# Patient Record
Sex: MALE | Race: WHITE
[De-identification: names, ages, dates, MRNs, and addresses within clinical notes are randomized per-mention and may not be internally consistent; named-entity substitution may affect disease eponyms.]

---
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
## 2021-02-18 ENCOUNTER — HOSPITAL ENCOUNTER (EMERGENCY)
Dept: HOSPITAL 53 - M ED | Age: 21
Discharge: HOME | End: 2021-02-18
Payer: COMMERCIAL

## 2021-02-18 VITALS — DIASTOLIC BLOOD PRESSURE: 75 MMHG | SYSTOLIC BLOOD PRESSURE: 174 MMHG

## 2021-02-18 VITALS — BODY MASS INDEX: 26.9 KG/M2 | WEIGHT: 198.64 LBS | HEIGHT: 72 IN

## 2021-02-18 DIAGNOSIS — J02.9: Primary | ICD-10-CM

## 2021-02-18 NOTE — CCD
Continuity of Care Document

                             Created on: 02/15/2021



HARINDER SANCHEZ JR

External Reference #: [id number]

: 2000

Sex: Male



Demographics





                          Address                   [address] [place]  [address]

 

                          Home Phone                [phone]

 

                          Preferred Language        Unknown

 

                          Marital Status            Unknown

 

                          Episcopalian Affiliation     Unknown

 

                          Race                      White

 

                          Ethnic Group              Not  or 





Author





                          Author                    [facility]

 

                          Address                   [address] [place]  [address]



 

                          Phone                     [phone]







Support





                Name            Relationship    Address         Phone

 

                    Kavin MAY PRS                 [address] [place]  [address]               [phone]

 

                    LEIGH SANCHEZ    PRS                 [address] [place]  [address]                    [phone]







Care Team Providers





                    Care Team Member Name Role                Phone

 

                          PCP                       Unavailable







Allergies, Adverse Reactions, Alerts

No known allergies.



Medications

No known medications.



Problems

No problem information available.



Procedures

No procedure information available.



Relevant Diagnostic Tests and/or Laboratory Data

Laboratory Results





          Test      Date/Time Result    Interpretation Reference Range Result Co

mment Performing 

Site

 

          BIOPSY IV 2021 3:11am SENT TO [facility]                  

             [facility], [address] [place] [address]

 

           SARS-CoV-2 (PCR) 2021 9:00am Not Detected            No

t Detected This 

nucleic acid amplification test was developed and itsperformance characteristics
determined by [facility]. Nucleic acid amplification tests include RT-
PCR and TMA. This test has not been FDA cleared orapproved. This test has been 
authorized by FDA under anEmergency Use Authorization (EUA). This test is 
onlyauthorized for the duration of time the declaration thatcircumstances exist 
justifying the authorization of theemergency use of in vitro diagnostic tests 
for detection tuIXZR-QoB-7 virus and/or diagnosis of COVID-19 infectionunder 
section 564(b)(1) of the Act, 21 U.S.C. 360bbb-3(b)(1), unless the authorization
is terminated or revokedsooner.When diagnostic testing is negative, the 
possibility of afalse negative result should be considered in the contextof a 
patient's recent exposures and the presence ofclinical signs and symptoms 
consistent with COVID-19. Anindividual without symptoms of COVID-19 and who is 
notshedding SARS-CoV-2 virus would expect to have a negative(not detected) 
result in this assay.                   [facility] ACCT# [id number]







Health Concerns

No known health concerns documented



Encounters





             Encounter    Location(s)  Arrival/Admit Date Discharge/Depart Date 

Provider(s)

 

                Departed [facility]  1:53am 

2021 5:47am              MEENA MAY @ 

 

             Registered Referral [facility] 2021 7:19am  

            MEENA MAY @ 

 

                    Registered Physician/Provider Office Visit [facility] 2020 4:41am                                         JANETTE PEREZ







Assessments

No Assessments Information Available



Functional Status

No Functional Status information available



Goals

No Goals Information Available



Immunizations

No Immunization Information Available



Mental Status

No Mental Status Information Available



Medical Equipment

No Medical Equipment Information available



Insurance Providers





                          Guarantor                 HARINDER SANCHEZ JR

 

                          Address                   [address] [place] [address]

 

                          Contact Info.             Home Phone: [phone]









          Payer     Policy Id Coverage Id Subscriber's Name Subscriber Id Effect

loli Date 

Expiration Date

 

          [facility] [id number]           HARINDER SANCHEZ JR         

             







Social History





                          Assigned Birth Sex        Male







Vital Signs

No vital signs result information available.

## 2021-02-18 NOTE — CCD
Summarization of Episode Note

                             Created on: 2020



Rick Sweet

External Reference #: [id number]

: 2000

Sex: Male



Demographics





                          Address                   [address] [place] [address]  [address]

 

                          Home Phone                [phone]

 

                          Preferred Language        Unknown

 

                          Marital Status            Unknown

 

                          Advent Affiliation     Unknown

 

                          Race                      White

 

                          Ethnic Group              Not  or 





Author





                          Author                    [facility]

 

                          Organization              [facility]

 

                          Address                   Unknown

 

                          Phone                     Unavailable







Support





                Name            Relationship    Address         Phone

 

                    Rick Sweet                [address], [place]  [id number] [phone]







Care Team Providers





                    Care Team Member Name Role                Phone

 

                    Mary Lancaster      Unavailable         Unavailable







PROBLEMS





          Type      Condition ICD9-CM Code GMU22-QS Code Onset Dates Condition S

tatus SNOMED 

Code                                    Notes

 

        Problem Tonsillar hypertrophy         J35.1           Active  [id number]  







ALLERGIES

No Known Allergies



ENCOUNTERS from 2000 to 2020





             Encounter    Location     Date         Provider     Diagnosis

 

                [facility] [address] [place] [address] 2020    Mary Lancaster                                  Tonsillar hypertrophy J35.1 ; Snoring R0

6.83 and Recurrent tonsillitis 

J03.91







IMMUNIZATIONS

No Information



SOCIAL HISTORY

Sex Assigned At Birth:



                          Social History Observation Description

 

                          Sex Assigned At Birth     Unknown







REASON FOR REFERRAL

No Information



VITAL SIGNS





                    Weight              200 lbs             09 Dec, 2020

 

                    Temperature         98 degrees Fahrenheit 09 Dec, 2020

 

                    Heart Rate          78 /min             09 Dec, 2020

 

                    Respiratory Rate    16 /min             09 Dec, 2020

 

                    Oximetry            98 %                09 Dec, 2020

 

                    Blood pressure systolic 124 mmHg            09 Dec, 2020

 

                    Blood pressure diastolic 78 mmHg             09 Dec, 2020







MEDICATIONS

No Known Medications



PROCEDURES

No Information



RESULTS

No Results



REASON FOR VISIT

Tonsillitis



MEDICAL (GENERAL) HISTORY





                    Type                Description         Date

 

                    Medical History     pneumonia-2020    

 

                    Surgical History    adenoids removed     

 

                    Surgical History    wisdom teeth extracted  







Goals Section

No Information



Health Concerns

No Information



MEDICAL EQUIPMENT

No Information



MENTAL STATUS

No Information



FUNCTIONAL STATUS

No Information



ASSESSMENTS





             Encounter Date Diagnosis    Assessment Notes Treatment Notes Treatm

ent Clinical 

Notes

 

                09 Dec, 2020    Tonsillar hypertrophy (ICD-10 - J35.1)          

       



                                        





 

                09 Dec, 2020    Snoring (ICD-10 - R06.83)                 



                                        





 

                09 Dec, 2020    Recurrent tonsillitis (ICD-10 - J03.91)         

        



                                        





 

                09 Dec, 2020    Other                           Advised [place]ons[place]

ctomy with Dr. Kennedy for tonsillar 

hypertrophy and recurrent pharyngitis. Pt v/u and agreeable. I have reviewed the

elective surgery safety guide with the patient and provided them with a copy. 
All questions were answered and the patient agrees to comply.







PLAN OF TREATMENT

Next Appt



                                        Details

 

                                        prn Reason:







Insurance Providers





             Payer Name   Payer Address Payer Phone  Insured Name Patient Relati

onship to 

Insured                   Coverage Start Date       Coverage End Date

 

                          [facility] [employer] [address] [facility]

[address] [phone] Rick Sweet  self

## 2021-02-18 NOTE — CCD
Summarization Of Episode

                             Created on: 2021



HARINDER SANCHEZ

External Reference #: [id number]

: 2000

Sex: Male



Demographics





                          Address                   [address] [address]  [address]

 

                          Home Phone                [phone]

 

                          Preferred Language        English

 

                          Marital Status            

 

                          Alevism Affiliation     [place]

 

                          Race                      White

 

                          Ethnic Group              Not  or 





Author





                          Author                    [facility]

 

                          Organization              [facility]

 

                          Address                   Unknown

 

                          Phone                     Unavailable







Support





                Name            Relationship    Address         Phone

 

                    LEIGH SANCHEZ    Next Of Kin         [address], [place]  [address] [phone]

 

                    [facility]             Next Of Kin         [address]

[place]

[place]  [address]                    Unavailable







Care Team Providers





                    Care Team Member Name Role                Phone

 

                    AIMEE KENNEDY MD Unavailable         Unavailable

 

                    AIMEE KENNEDY MD Unavailable         Unavailable

 

                    AIMEE KENNEDY MD Unavailable         Unavailable

 

                    AIMEE KENNEDY MD Unavailable         Unavailable

 

                    AIMEE KENNEDY MD Unavailable         Unavailable

 

                    AIMEE KENNEDY MD Unavailable         Unavailable

 

                    AIMEE KENNEDY MD Unavailable         Unavailable

 

                    AIMEE KENNEDY MD Unavailable         Unavailable

 

                    AIMEE KENNEDY MD Unavailable         Unavailable

 

                    AIMEE KENNEDY MD Unavailable         Unavailable

 

                    AIMEE KENNEDY MD Unavailable         Unavailable

 

                    AIMEE KENNEDY MD Unavailable         Unavailable

 

                    AIMEE KENNEDY MD Unavailable         Unavailable

 

                    AIMEE KENNEDY MD Unavailable         Unavailable

 

                    AIMEE KENNEDY MD Unavailable         Unavailable

 

                    AIMEE KENNEDY MD Unavailable         Unavailable

 

                    AIMEE KENNEDY MD Unavailable         Unavailable

 

                    AIMEE KENNEDY MD Unavailable         Unavailable

 

                    AIMEE KENNEDY MD Unavailable         Unavailable

 

                    AIMEE KENNEDY MD Unavailable         Unavailable

 

                    AIMEE KENNEDY MD Unavailable         Unavailable

 

                    AIMEE KENNEDY MD Unavailable         Unavailable

 

                    AIMEE KENNEDY MD Unavailable         Unavailable

 

                    AIMEE KENNEDY MD Unavailable         Unavailable

 

                    AIMEE KENNEDY MD Unavailable         Unavailable

 

                    AIMEE KENNEDY MD Unavailable         Unavailable

 

                    AIMEE KENNEDY MD Unavailable         Unavailable

 

                    AIMEE KENNEDY MD Unavailable         Unavailable

 

                    AIMEE KENNEDY MD Unavailable         Unavailable

 

                    AIMEE KENNEDY MD Unavailable         Unavailable

 

                    AIMEE KENNEDY MD Unavailable         Unavailable

 

                    YADIRA, Kavin  MEENA Unavailable         Unavailable

 

                    Tonny, Frances Nixe FNP-C Unavailable         Unavailabl

e

 

                    Tonny, Frances Nixe FNP-C Unavailable         Unavailabl

e

 

                    Tonny, Frances Nixe FNP-C Unavailable         Unavailabl

e

 

                    Tonny, Reginah W Mary FNP-C Unavailable         Unavailabl

e

 

                    Tonny, Frances W Mary FNP-C Unavailable         Unavailabl

e

 

                    Tonny, Frances W Mary FNP-C Unavailable         Unavailabl

e

 

                    Tonny, Frances W Mary FNP-C Unavailable         Unavailabl

e

 

                    Tonny, Frances W Mary FNP-C Unavailable         Unavailabl

e

 

                    Tonny, Frances W Mary FNP-C Unavailable         Unavailabl

e

 

                    Tonny, Frances W Mary FNP-C Unavailable         Unavailabl

e

 

                    Tonny, Frances W Mary FNP-C Unavailable         Unavailabl

e

 

                    Tonny, Frances W Mary FNP-C Unavailable         Unavailabl

e

 

                    Tonny, Frances W Mary FNP-C Unavailable         Unavailabl

e

 

                    Tonny, Frances W Mary FNP-C Unavailable         Unavailabl

e

 

                    Tonny, Frances W Mary FNP-C Unavailable         Unavailabl

e

 

                    Tonny, Frances W Mary FNP-C Unavailable         Unavailabl

e

 

                    Tonny, Frances W Mary FNP-C Unavailable         Unavailabl

e

 

                    Tonny, Colleen W Mary FNP-C Unavailable         Unavailabl

e

 

                    Tonny, Frances W Mary FNP-C Unavailable         Unavailabl

e

 

                    Tonny, Frances W Mary FNP-C Unavailable         Unavailabl

e

 

                    Tonny, Colleen W Mary FNP-C Unavailable         Unavailabl

e

 

                    Tonny, Regjennifer W Mary FNP-C Unavailable         Unavailabl

e

 

                    Tonny, Regjennifer W Mary FNP-C Unavailable         Unavailabl

e

 

                    Tonny, Regjennifer W Mary FNP-C Unavailable         Unavailabl

e

 

                    Tonny, Regina W Mary FNP-C Unavailable         Unavailabl

e

 

                    Tonny, Regrichie W Mary FNP-C Unavailable         Unavailabl

e

 

                    Tonny, Regjennifer W Mary FNP-C Unavailable         Unavailabl

e

 

                    Tonny, Regrichie W Mary FNP-C Unavailable         Unavailabl

e

 

                    Tonny, Regrichie W Mary FNP-C Unavailable         Unavailabl

e

 

                    Tonny, Regjennifer W Mary FNP-C Unavailable         Unavailabl

e

 

                    Tonny, Frances Hernandez FNP-C Unavailable         Unavailabl

e

 

                    Tonny, Frances Hernandez FNP-C Unavailable         Unavailabl

e



                                  



Re-disclosure Warning

          The records that you are about to access may contain information from 
federally-assisted alcohol or drug abuse programs. If such information is 
present, then the following federally mandated warning applies: This information
has been disclosed to you from records protected by federal confidentiality 
rules (42 CFR part 2). The federal rules prohibit you from making any further 
disclosure of this information unless further disclosure is expressly permitted 
by the written consent of the person to whom it pertains or as otherwise 
permitted by 42 CFR part 2. A general authorization for the release of medical 
or other information is NOT sufficient for this purpose. The Federal rules 
restrict any use of the information to criminally investigate or prosecute any 
alcohol or drug abuse patient.The records that you are about to access may 
contain highly sensitive health information, the redisclosure of which is 
protected by Article 27-F of the [facility] Public Health law. If you 
continue you may have access to information: Regarding HIV / AIDS; Provided by 
facilities licensed or operated by the [facility] Office of Mental Health; 
or Provided by the [facility] Office for People With Developmental 
Disabilities. If such information is present, then the following [place] 
mandated warning applies: This information has been disclosed to you from 
confidential records which are protected by state law. State law prohibits you 
from making any further disclosure of this information without the specific 
written consent of the person to whom it pertains, or as otherwise permitted by 
law. Any unauthorized further disclosure in violation of state law may result in
a fine or detention sentence or both. A general authorization for the release of 
medical or other information is NOT sufficient authorization for further disc
losure.                                                                         
    



Allergies and Adverse Reactions

          



           Type       Description Substance  Reaction   Status     Data Source(s

)

 

           Drug allergy No Known Allergies No Known Allergies                   

    [facility]



                                                                                
       



Encounters

          



           Encounter  Providers  Location   Date       Indications Data Source(s

)

 

                Outpatient      Attender: MEENA Steen: MEENA DIAZ                 2021 

06:53:00 AM EST - 2021 10:47:00 AM EST                           [facility]

 

                                        Patient discharged. 

 

                    Outpatient          Attender: MEENA Rosalesender: MEENA KENNEDY EMERGENCY ROOM-LAB 

REF                 2021 12:19:00 PM EST - 2021 12:19:00 PM [facility] 2021 12:00:00 

AM [place] [address] ([facility])

 

           Outpatient Attender: Mary Lancaster GRAB[facility]ELP-C            2020 09:41:0

0 AM [facility] 2020 12:00:00 

AM [place] [address] ([facility])



                                                                                
                           



Insurance Providers

          



             Payer name   Policy type / Coverage type Policy ID    Covered party

 ID Covered 

party's relationship to toney Policy Toney             Plan Information

 

          [facility] [employer]           [id number]           SP             

     [id number]

 

          [facility]           [id number]           S               

    [id number]

 

          [facility]           [id number]           S               

    [id number]



                                                                                
                           



Problems, Conditions, and Diagnoses

          



           Code       Display Name Description Problem Type Effective Dates Data

 Source(s)

 

           J35.1      [id number]   Tonsillar hypertrophy Problem    2020 12:

00:00 AM [place] [address] 

([facility])

 

                    Z01.818             Encounter for other preprocedural examin

ation ENCOUNTER FOR OTHER 

PREPROCEDURAL EXAMINATION Diagnosis           2021 12:19:00 PM [facility]

ospital

 

           R06.83     Snoring    SNORING    Diagnosis  2020 09:41:00 AM [facility]

 

                    J03.91              Acute recurrent tonsillitis, unspecified

 ACUTE RECURRENT TONSILLITIS, 

UNSPECIFIED         Diagnosis           2020 09:41:00 AM [facility]

 

             J35.1        Hypertrophy of tonsils HYPERTROPHY OF TONSILS Diagnosi

s    2020 

09:41:00 AM [facility]



                                                                                
                                               



Results

          



                    ID                  Date                Data Source

 

                    [id number]       02/15/2021 08:45:00 AM [facility]

 

                                         DATE OF PROCEDURE: 2021 OPERATION

: Tonsillectomy. PREOPERATIVE DIAGNOSIS:

Chronic tonsillitis. POSTOPERATIVE DIAGNOSIS: Same. SURGEON: Dr. TATE Kennedy. ANESTHESIA: General endotracheal. PROCEDURE: Under satisfactory 
endotracheal anesthesia, large debris-filled tonsils were removed using sharp 
dissection. Bleeding was extremely carefully controlled with pressure packing 
and electrocautery. The noes and nasopharynx were irrigated. The stomach was 
aspirated. The patient tolerated the procedure will. Blood loss was moderate. 
The patient was taken to the recovery room in excellent condition. There were no
complications.   









          Name      Value     Range     Interpretation Code Description Data Jessica

rce(s) Supporting 

Document(s)

 

                                                                       









                    ID                  Date                Data Source

 

                    [id number]         2021 04:05:00 PM EST [facility]









          Name      Value     Range     Interpretation Code Description Data Jessica

rce(s) Supporting 

Document(s)

 

          Pathology Report                                         [facility]    

 

                                        .                                       

                         01Material 

submitted:                                        .PART A: tonsil - LEFT TONSIL.
Modifiers: leftPART B: tonsil - RIGHT TONSIL. Modifiers: right.                 
                                              01Clinical history:               
                          .CHRONIC TONSILLITIS, BILATERAL TONSILLECTOMY.        
                                                       
02**********************************************************************Diagnosi

s:A)PALATINE TONSIL, LEFT SIDE (TONSILLECTOMY):CHRONIC TONSILLITIS AND LYMPHOID 
HYPERPLASIA. B)PALATINE TONSIL, RIGHT SIDE (TONSILLECTOMY):CHRONIC TONSILLITIS 
AND LYMPHOID HYPERPLASIA.Formerly [facility]  2021  1059 
Local**********************************************************************.    
                                                           02Electronically 
signed:                                     .Hugo Braga MD, Pathologist.  
                                                             01Gross 
description:                                         .Specimen A received in 
formalin labeled "left tonsil" are two partlyfragmented pieces of rubbery cau
terized tan red tissue with deep internalcrypts.  The pieces measure 2.5 x 1.7 x
1.0 cm and 3.5 x 2.2 x 2.0 cm.Representative sections are submitted in one 
cassette. Specimen B received in formalin labeled "right tonsil" is a 2.8 x 2.2 
x2.0 cm ovoid lobulated rubbery pink tan tonsil with pitted partly crypticcut 
surfaces.  Representative sections are submitted in one cassette. DE/NEPTALI  02/15/
2021  [address] [place], [place] [address]Pathologist provided ICD-10:J35.1.                                            
                   02CPT                                                        
.[id number], [id number] 









                    ID                  Date                Data Source

 

                    [id number]         2021 02:00:00 PM EST [facility]









          Name      Value     Range     Interpretation Code Description Data Jessica

rce(s) Supporting 

Document(s)

 

          SARS coronavirus 2 RNA Not Detected                               [facility]

[place]     

 

                                        This lab was ordered by [facility] a

nd reported by [facility]. 









                    ID                  Date                Data Source

 

                    [id number] 2021 02:05:00 PM EST [facility]

al









          Name      Value     Range     Interpretation Code Description Data Jessica

rce(s) Supporting 

Document(s)

 

          SARS COV2 [facility] Not Detected Not Detected                     [facility]  

 

                                        This nucleic acid amplification test was

 developed and itsperformance 

characteristics determined by [facility]. Nucleic acid amplification 
tests include RT-PCR and TMA. This test has not been FDA cleared orapproved. 
This test has been authorized by FDA under anEmergency Use Authorization (EUA). 
This test is onlyauthorized for the duration of time the declaration 
thatcircumstances exist justifying the authorization of theemergency use of in 
vitro diagnostic tests for detection fmJBCB-ZhO-1 virus and/or diagnosis of 
COVID-19 infectionunder section 564(b)(1) of the Act, 21 U.S.C. 360bbb-3(b)(1), 
unless the authorization is terminated or revokedsooner.When diagnostic testing 
is negative, the possibility of afalse negative result should be considered in 
the contextof a patient's recent exposures and the presence ofclinical signs and
symptoms consistent with COVID-19. Anindividual without symptoms of COVID-19 and
who is notshedding SARS-CoV-2 virus would expect to have a negative(not detect
ed) result in this assay. 









                    ID                  Date                Data Source

 

                    [id number]         2021 02:05:00 PM EST [facility]









          Name      Value     Range     Interpretation Code Description Data Jessica

rce(s) Supporting 

Document(s)

 

          SARS-CoV-2, JULIA Not Detected Not Detected                     [facility] 

   

 

                                        This nucleic acid amplification test was

 developed and its 

performancecharacteristics determined by [facility]. Nucleic 
acidamplification tests include RT-PCR and TMA. This test has not beenFDA 
cleared or approved. This test has been authorized by FDA underan Emergency Use 
Authorization (EUA). This test is only authorizedfor the duration of time the 
declaration that circumstances existjustifying the authorization of the 
emergency use of in vitrodiagnostic tests for detection of SARS-CoV-2 virus 
and/or diagnosisof COVID-19 infection under section 564(b)(1) of the Act, 21 
U.S.C.360bbb-3(b) (1), unless the authorization is terminated or 
revokedsooner.When diagnostic testing is negative, the possibility of a 
falsenegative result should be considered in the context of a patient'srecent 
exposures and the presence of clinical signs and symptomsconsistent with COVID-
19. An individual without symptoms of COVID-19and who is not shedding SARS-CoV-2
virus would expect to have anegative (not detected) result in this assay. 









                    ID                  Date                Data Source

 

                    [id number]         2021 02:05:00 PM EST [facility]









          Name      Value     Range     Interpretation Code Description Data Jessica

rce(s) Supporting 

Document(s)

 

          Inpatient                                         [facility]    

 

                                        Received 







                                        Procedure

 

                                          



                                                                                
                                                                             



Vital Signs

          



                    ID                  Date                Data Source

 

                    UNK                                      









           Name       Value      Range      Interpretation Code Description Data

 Source(s)

 

           Oxygen saturation in Arterial blood by Pulse oximetry 98 %           

                  98 %       eCW1 ([facility])

 

           Respiratory rate 16 /min                          16 /min    eCW1 ([facility])

 

           Heart rate 78 /min                          78 /min    eCW1 ([facility])

 

           Body temperature 98 [degF]                        98 [degF]  eCW1 ([facility])

 

           Body weight 200 [lb_av]                       200 [lb_av] eCW1 ([facility])

## 2021-02-18 NOTE — CCD
Summarization of Episode Note

                             Created on: 2021



Rick Sweet

External Reference #: [id number]

: 2000

Sex: Male



Demographics





                          Address                   [address], [place]  [address]

 

                          Home Phone                [phone]

 

                          Preferred Language        Unknown

 

                          Marital Status            Unknown

 

                          Judaism Affiliation     Unknown

 

                          Race                      White

 

                          Ethnic Group              Not  or 





Author





                          Author                    [facility]

 

                          Organization              [facility]

 

                          Address                   Unknown

 

                          Phone                     Unavailable







Support





                Name            Relationship    Address         Phone

 

                    Rick Sweet  [facility]                [address], [place]  [address]                    [phone]







Care Team Providers





                    Care Team Member Name Role                Phone

 

                    Mauricio Kennedy     Unavailable         [phone]







PROBLEMS





          Type      Condition ICD9-CM Code EFC49-GM Code Onset Dates Condition S

tatus W/U 

Status              Risk                SNOMED Code         Notes

 

       Problem Tonsillar hypertrophy        J35.1         Active confirmed      

  [id number]  







ALLERGIES

No Known Allergies



ENCOUNTERS from 2000 to 2021





             Encounter    Location     Date         Provider     Diagnosis

 

                    [address] [address]                  Mauricio Kennedy             Encounter for pre-operative 

examination Z01.818







IMMUNIZATIONS

No Information



SOCIAL HISTORY

Sex Assigned At Birth:



                          Social History Observation Description

 

                          Sex Assigned At Birth     Unknown







REASON FOR REFERRAL

No Information



VITAL SIGNS

No information



MEDICATIONS

No Information



PROCEDURES

No Information



RESULTS

No Results



REASON FOR VISIT

PRE-OP COVID-19 TEST



MEDICAL (GENERAL) HISTORY





                    Type                Description         Date

 

                    Medical History     pneumonia-2020    

 

                    Surgical History    adenoids removed     

 

                    Surgical History    wisdom teeth extracted  







Goals Section

No Information



Health Concerns

No Information



MEDICAL EQUIPMENT

No Information



MENTAL STATUS

No Information



FUNCTIONAL STATUS

No Information



ASSESSMENTS





             Encounter Date Diagnosis    Assessment Notes Treatment Notes Treatm

ent Clinical 

Notes

 

                    Encounter for pre-operative examination (ICD-10 

- Z01.818)                 



                                        











PLAN OF TREATMENT

Treatment Notes



                          Test Name                 Order Date

 

                          COVID19                   2021



Next Appt



                                        Details

 

                                        Provider Name:Mary Lancaster, 2021 0

2:00:00 PM, [address] [place], [place], [id number], [phone]







Insurance Providers





             Payer Name   Payer Address Payer Phone  Insured Name Patient Relati

onship to 

Insured                   Coverage Start Date       Coverage End Date

 

                          [facility] [employer] [address]

[facility] [address] [place] [address]

[id number] [phone] Rick Sweet  self - - -

## 2021-02-18 NOTE — CCD
Summarization Of Episode

                             Created on: 2021



HARINDER SANCHEZ

External Reference #: [id number]

: 2000

Sex: Male



Demographics





                          Address                   [address] [place] [address]  [address]

 

                          Home Phone                [phone]

 

                          Preferred Language        English

 

                          Marital Status            

 

                          M[place]onism Affiliation     [place]

 

                          Race                      White

 

                          Ethnic Group              Not  or 





Author





                          Author                    [facility]

 

                          Organization              [facility]

 

                          Address                   Unknown

 

                          Phone                     Unavailable







Support





                Name            Relationship    Address         Phone

 

                    LEIGH SANCHEZ    Next Of Kin         [address], [place]  [id number] [phone]

 

                    [facility]             Next Of Kin         [address]

[place]

[place]  [address]                    Unavailable







Care Team Providers





                    Care Team Member Name Role                Phone

 

                    AIMEE KENNEDY MD Unavailable         Unavailable

 

                    AIMEE KENNEDY MD Unavailable         Unavailable

 

                    AIMEE KENNEDY MD Unavailable         Unavailable

 

                    AIMEE KENNEDY MD Unavailable         Unavailable

 

                    AIMEE KENNEDY MD Unavailable         Unavailable

 

                    AIMEE KENNEDY MD Unavailable         Unavailable

 

                    AIMEE KENNEDY MD Unavailable         Unavailable

 

                    AIMEE KENNEDY MD Unavailable         Unavailable

 

                    AIMEE KENNEDY MD Unavailable         Unavailable

 

                    AIMEE KENNEDY MD Unavailable         Unavailable

 

                    AIMEE KENNEDY MD Unavailable         Unavailable

 

                    AIMEE KENNEDY MD Unavailable         Unavailable

 

                    AIMEE KENNEDY MD Unavailable         Unavailable

 

                    AIMEE KENNEDY MD Unavailable         Unavailable

 

                    AIMEE KENNEDY MD Unavailable         Unavailable

 

                    AIMEE KENNEDY MD Unavailable         Unavailable

 

                    AIMEE KENNEDY MD Unavailable         Unavailable

 

                    AIMEE KENNEDY MD Unavailable         Unavailable

 

                    AIMEE KENNEDY MD Unavailable         Unavailable

 

                    AIMEE KENNEDY MD Unavailable         Unavailable

 

                    AIMEE KENNEDY MD Unavailable         Unavailable

 

                    AIMEE KENNEDY MD Unavailable         Unavailable

 

                    AIMEE KENNEDY MD Unavailable         Unavailable

 

                    AIMEE KENNEDY MD Unavailable         Unavailable

 

                    AIMEE KENNEDY MD Unavailable         Unavailable

 

                    AIMEE KENNEDY MD Unavailable         Unavailable

 

                    AIMEE KENNEDY MD Unavailable         Unavailable

 

                    AIMEE KENNEDY MD Unavailable         Unavailable

 

                    AIMEE KENNEDY MD Unavailable         Unavailable

 

                    AIMEE KENNEDY MD Unavailable         Unavailable

 

                    AIMEE KENNEDY MD Unavailable         Unavailable

 

                    YADIRA, Kavin  MEENA Unavailable         Unavailable

 

                    Tonny, Frances Nixe FNP-C Unavailable         Unavailabl

e

 

                    Tonny, Frances Nixe FNP-C Unavailable         Unavailabl

e

 

                    Tonny, Frances Nixe FNP-C Unavailable         Unavailabl

e

 

                    Tonny, Reginah W Mary FNP-C Unavailable         Unavailabl

e

 

                    Tonny, Frances W Mary FNP-C Unavailable         Unavailabl

e

 

                    Tonny, Frances W Mary FNP-C Unavailable         Unavailabl

e

 

                    Tonny, Frances W Mary FNP-C Unavailable         Unavailabl

e

 

                    Tonny, Frances W Mary FNP-C Unavailable         Unavailabl

e

 

                    Tonny, Frances W Mary FNP-C Unavailable         Unavailabl

e

 

                    Tonny, Frances W Mary FNP-C Unavailable         Unavailabl

e

 

                    Tonny, Frances W Mary FNP-C Unavailable         Unavailabl

e

 

                    Tonny, Frances W Mary FNP-C Unavailable         Unavailabl

e

 

                    Tonny, Frances W Mary FNP-C Unavailable         Unavailabl

e

 

                    Tonny, Frances W Mary FNP-C Unavailable         Unavailabl

e

 

                    Tonny, Frances W Mary FNP-C Unavailable         Unavailabl

e

 

                    Tonny, Frances W Mary FNP-C Unavailable         Unavailabl

e

 

                    Tonny, Frances W Mary FNP-C Unavailable         Unavailabl

e

 

                    Tonny, Colleen W Mary FNP-C Unavailable         Unavailabl

e

 

                    Tonny, Frances W Mary FNP-C Unavailable         Unavailabl

e

 

                    Tonny, Frances W Mary FNP-C Unavailable         Unavailabl

e

 

                    Tonny, Colleen W Mary FNP-C Unavailable         Unavailabl

e

 

                    Tonny, Regjennifer W Mary FNP-C Unavailable         Unavailabl

e

 

                    Tonny, Regjennifer W Mary FNP-C Unavailable         Unavailabl

e

 

                    Tonny, Regjennifer W Mary FNP-C Unavailable         Unavailabl

e

 

                    Tonny, Regina W Mary FNP-C Unavailable         Unavailabl

e

 

                    Tonny, Regrichie W Mary FNP-C Unavailable         Unavailabl

e

 

                    Tonny, Regjennifer W Mary FNP-C Unavailable         Unavailabl

e

 

                    Tonny, Regrichie W Mary FNP-C Unavailable         Unavailabl

e

 

                    Tonny, Regrichie W Mary FNP-C Unavailable         Unavailabl

e

 

                    Tonny, Regjennifer W Mary FNP-C Unavailable         Unavailabl

e

 

                    Tonny, Frances Hernandez FNP-C Unavailable         Unavailabl

e

 

                    Tonny, Frances Hernandez FNP-C Unavailable         Unavailabl

e



                                  



Re-disclosure Warning

          The records that you are about to access may contain information from 
federally-assisted alcohol or drug abuse programs. If such information is 
present, then the following federally mandated warning applies: This information
has been disclosed to you from records protected by federal confidentiality 
rules (42 CFR part 2). The federal rules prohibit you from making any further 
disclosure of this information unless further disclosure is expressly permitted 
by the written consent of the person to whom it pertains or as otherwise 
permitted by 42 CFR part 2. A general authorization for the release of medical 
or other information is NOT sufficient for this purpose. The Federal rules 
restrict any use of the information to criminally investigate or prosecute any 
alcohol or drug abuse patient.The records that you are about to access may 
contain highly sensitive health information, the redisclosure of which is 
protected by Article 27-F of the [facility] Public Health law. If you 
continue you may have access to information: Regarding HIV / AIDS; Provided by 
facilities licensed or operated by the [facility] Office of Mental Health; 
or Provided by the [facility] Office for People With Developmental 
Disabilities. If such information is present, then the following [place] 
mandated warning applies: This information has been disclosed to you from 
confidential records which are protected by state law. State law prohibits you 
from making any further disclosure of this information without the specific 
written consent of the person to whom it pertains, or as otherwise permitted by 
law. Any unauthorized further disclosure in violation of state law may result in
a fine or intermediate sentence or both. A general authorization for the release of 
medical or other information is NOT sufficient authorization for further disc
losure.                                                                         
    



Allergies and Adverse Reactions

          



           Type       Description Substance  Reaction   Status     Data Source(s

)

 

           Drug allergy No Known Allergies No Known Allergies                   

    [facility]



                                                                                
       



Encounters

          



           Encounter  Providers  Location   Date       Indications Data Source(s

)

 

                Outpatient      Attender: MEENA Steen: MEENA DIAZ                 2021 

06:53:00 AM EST - 2021 10:47:00 AM EST                           [facility]

 

                                        Patient discharged. 

 

                    Outpatient          Attender: MEENA Rosalesender: MEENA KENNEDY EMERGENCY ROOM-LAB 

REF                 2021 12:19:00 PM EST - 2021 12:19:00 PM [facility] 2021 12:00:00 

AM [place] [address] ([facility])

 

           Outpatient Attender: Mary Lancaster [facility]            2020 09:41:0

0 AM [facility] 2020 12:00:00 

AM [place] [address] ([facility])



                                                                                
                           



Insurance Providers

          



             Payer name   Policy type / Coverage type Policy ID    Covered party

 ID Covered 

party's relationship to toney Policy Toney             Plan Information

 

          [facility] [employer]           [id number]           SP             

     [id number]

 

          [facility]           [id number]           S               

    [id number]

 

          [facility]           [id number]           S               

    [id number]



                                                                                
                           



Problems, Conditions, and Diagnoses

          



           Code       Display Name Description Problem Type Effective Dates Data

 Source(s)

 

           J35.1      [id number]   Tonsillar hypertrophy Problem    2020 12:

00:00 AM [place] [address] 

([facility])

 

                    Z01.818             Encounter for other preprocedural examin

ation ENCOUNTER FOR OTHER 

PREPROCEDURAL EXAMINATION Diagnosis           2021 12:19:00 PM [facility]

ospital

 

           R06.83     Snoring    SNORING    Diagnosis  2020 09:41:00 AM [facility]

 

                    J03.91              Acute recurrent tonsillitis, unspecified

 ACUTE RECURRENT TONSILLITIS, 

UNSPECIFIED         Diagnosis           2020 09:41:00 AM [facility]

 

             J35.1        Hypertrophy of tonsils HYPERTROPHY OF TONSILS Diagnosi

s    2020 

09:41:00 AM [facility]



                                                                                
                                               



Results

          



                    ID                  Date                Data Source

 

                    [id number]       02/15/2021 08:45:00 AM [facility]

 

                                         DATE OF PROCEDURE: 2021 OPERATION

: Tonsillectomy. PREOPERATIVE DIAGNOSIS:

Chronic tonsillitis. POSTOPERATIVE DIAGNOSIS: Same. SURGEON: Dr. TATE Kennedy. ANESTHESIA: General endotracheal. PROCEDURE: Under satisfactory 
endotracheal anesthesia, large debris-filled tonsils were removed using sharp 
dissection. Bleeding was extremely carefully controlled with pressure packing 
and electrocautery. The noes and nasopharynx were irrigated. The stomach was 
aspirated. The patient tolerated the procedure will. Blood loss was moderate. 
The patient was taken to the recovery room in excellent condition. There were no
complications.   









          Name      Value     Range     Interpretation Code Description Data Jessica

rce(s) Supporting 

Document(s)

 

                                                                       









                    ID                  Date                Data Source

 

                    [id number]         2021 04:05:00 PM EST [facility]









          Name      Value     Range     Interpretation Code Description Data Jessica

rce(s) Supporting 

Document(s)

 

          Pathology Report                                         [facility]    

 

                                        .                                       

                         01Material 

submitted:                                        .PART A: tonsil - LEFT TONSIL.
Modifiers: leftPART B: tonsil - RIGHT TONSIL. Modifiers: right.                 
                                              01Clinical history:               
                          .CHRONIC TONSILLITIS, BILATERAL TONSILLECTOMY.        
                                                       
02**********************************************************************Diagnosi

s:A)PALATINE TONSIL, LEFT SIDE (TONSILLECTOMY):CHRONIC TONSILLITIS AND LYMPHOID 
HYPERPLASIA. B)PALATINE TONSIL, RIGHT SIDE (TONSILLECTOMY):CHRONIC TONSILLITIS 
AND LYMPHOID HYPERPLASIA.[facility]  2021  1059 
Local**********************************************************************.    
                                                           02Electronically 
signed:                                     .Hugo Braga MD, Pathologist.  
                                                             01Gross 
description:                                         .Specimen A received in 
formalin labeled "left tonsil" are two partlyfragmented pieces of rubbery cau
terized tan red tissue with deep internalcrypts.  The pieces measure 2.5 x 1.7 x
1.0 cm and 3.5 x 2.2 x 2.0 cm.Representative sections are submitted in one 
cassette. Specimen B received in formalin labeled "right tonsil" is a 2.8 x 2.2 
x2.0 cm ovoid lobulated rubbery pink tan tonsil with pitted partly crypticcut 
surfaces.  Representative sections are submitted in one cassette. DE/NEPTALI  02/15/
2021  [address] [place], [place] [address]Pathologist provided ICD-10:J35.1.                                            
                   02CPT                                                        
.[id number], [id number] 









                    ID                  Date                Data Source

 

                    [id number]         2021 02:00:00 PM EST [facility]









          Name      Value     Range     Interpretation Code Description Data Jessica

rce(s) Supporting 

Document(s)

 

          SARS coronavirus 2 RNA Not Detected                               [facility]

[place]     

 

                                        This lab was ordered by [facility] a

nd reported by [facility]. 









                    ID                  Date                Data Source

 

                    [id number] 2021 02:05:00 PM EST [facility]

al









          Name      Value     Range     Interpretation Code Description Data Jessica

rce(s) Supporting 

Document(s)

 

          SARS COV2 [facility] Not Detected Not Detected                     [facility]  

 

                                        This nucleic acid amplification test was

 developed and itsperformance 

characteristics determined by [facility]. Nucleic acid amplification 
tests include RT-PCR and TMA. This test has not been FDA cleared orapproved. 
This test has been authorized by FDA under anEmergency Use Authorization (EUA). 
This test is onlyauthorized for the duration of time the declaration 
thatcircumstances exist justifying the authorization of theemergency use of in 
vitro diagnostic tests for detection aaHJTC-AoP-6 virus and/or diagnosis of 
COVID-19 infectionunder section 564(b)(1) of the Act, 21 U.S.C. 360bbb-3(b)(1), 
unless the authorization is terminated or revokedsooner.When diagnostic testing 
is negative, the possibility of afalse negative result should be considered in 
the contextof a patient's recent exposures and the presence ofclinical signs and
symptoms consistent with COVID-19. Anindividual without symptoms of COVID-19 and
who is notshedding SARS-CoV-2 virus would expect to have a negative(not detect
ed) result in this assay. 









                    ID                  Date                Data Source

 

                    [id number]         2021 02:05:00 PM EST [facility]









          Name      Value     Range     Interpretation Code Description Data Jessica

rce(s) Supporting 

Document(s)

 

          SARS-CoV-2, JULIA Not Detected Not Detected                     [facility] 

   

 

                                        This nucleic acid amplification test was

 developed and its 

performancecharacteristics determined by [facility]. Nucleic 
acidamplification tests include RT-PCR and TMA. This test has not beenFDA 
cleared or approved. This test has been authorized by FDA underan Emergency Use 
Authorization (EUA). This test is only authorizedfor the duration of time the 
declaration that circumstances existjustifying the authorization of the 
emergency use of in vitrodiagnostic tests for detection of SARS-CoV-2 virus 
and/or diagnosisof COVID-19 infection under section 564(b)(1) of the Act, 21 
U.S.C.360bbb-3(b) (1), unless the authorization is terminated or 
revokedsooner.When diagnostic testing is negative, the possibility of a 
falsenegative result should be considered in the context of a patient'srecent 
exposures and the presence of clinical signs and symptomsconsistent with COVID-
19. An individual without symptoms of COVID-19and who is not shedding SARS-CoV-2
virus would expect to have anegative (not detected) result in this assay. 









                    ID                  Date                Data Source

 

                    [id number]         2021 02:05:00 PM EST [facility]









          Name      Value     Range     Interpretation Code Description Data Jessica

rce(s) Supporting 

Document(s)

 

          Inpatient                                         [facility]    

 

                                        Received 







                                        Procedure

 

                                          



                                                                                
                                                                             



Vital Signs

          



                    ID                  Date                Data Source

 

                    UNK                                      









           Name       Value      Range      Interpretation Code Description Data

 Source(s)

 

           Oxygen saturation in Arterial blood by Pulse oximetry 98 %           

                  98 %       eCW1 ([facility])

 

           Respiratory rate 16 /min                          16 /min    eCW1 ([facility])

 

           Heart rate 78 /min                          78 /min    eCW1 ([facility])

 

           Body temperature 98 [degF]                        98 [degF]  eCW1 ([facility])

 

           Body weight 200 [lb_av]                       200 [lb_av] eCW1 ([facility])

## 2021-05-10 ENCOUNTER — HOSPITAL ENCOUNTER (EMERGENCY)
Dept: HOSPITAL 53 - M ED | Age: 21
Discharge: HOME | End: 2021-05-10
Payer: COMMERCIAL

## 2021-05-10 VITALS — DIASTOLIC BLOOD PRESSURE: 71 MMHG | SYSTOLIC BLOOD PRESSURE: 137 MMHG

## 2021-05-10 VITALS — WEIGHT: 211.64 LBS | BODY MASS INDEX: 29.63 KG/M2 | HEIGHT: 71 IN

## 2021-05-10 DIAGNOSIS — J12.2: ICD-10-CM

## 2021-05-10 DIAGNOSIS — J20.9: Primary | ICD-10-CM

## 2021-05-10 DIAGNOSIS — J02.9: ICD-10-CM

## 2021-05-10 PROCEDURE — 99284 EMERGENCY DEPT VISIT MOD MDM: CPT

## 2021-05-10 PROCEDURE — 71046 X-RAY EXAM CHEST 2 VIEWS: CPT

## 2021-05-10 PROCEDURE — 87798 DETECT AGENT NOS DNA AMP: CPT

## 2021-05-10 NOTE — REPVR
PROCEDURE INFORMATION: 

Exam: XR Chest 

Exam date and time: 5/10/2021 6:55 AM 

Age: 21 years old 

Clinical indication: Wheezing 



TECHNIQUE: 

Imaging protocol: XR of the chest. 

Views: 2 views. 



COMPARISON: 

[place] PORTABLE CHEST X-RAY 11/9/2020 8:16 AM 



FINDINGS: 

Lungs: Unremarkable. No consolidation. 

Pleural spaces: Unremarkable. No pleural effusion. No pneumothorax. 

Heart/Mediastinum: Unremarkable. No cardiomegaly. 

Bones/joints: Unremarkable. 



IMPRESSION: 

No acute findings. 



Electronically signed by: Marlon Bailey On 05/10/2021  07:18:29 AM

## 2023-05-09 ENCOUNTER — HOSPITAL ENCOUNTER (EMERGENCY)
Dept: HOSPITAL 53 - M ED | Age: 23
Discharge: HOME | End: 2023-05-09
Payer: COMMERCIAL

## 2023-05-09 VITALS — BODY MASS INDEX: 33.43 KG/M2 | HEIGHT: 71 IN | WEIGHT: 238.76 LBS

## 2023-05-09 VITALS — DIASTOLIC BLOOD PRESSURE: 77 MMHG | SYSTOLIC BLOOD PRESSURE: 140 MMHG

## 2023-05-09 DIAGNOSIS — T31.0: ICD-10-CM
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
## 2023-05-09 DIAGNOSIS — Z79.52: ICD-10-CM

## 2023-05-09 DIAGNOSIS — T23.201A: ICD-10-CM

## 2023-05-09 DIAGNOSIS — T23.101A: Primary | ICD-10-CM
